# Patient Record
Sex: FEMALE | Race: BLACK OR AFRICAN AMERICAN | Employment: UNEMPLOYED | URBAN - METROPOLITAN AREA
[De-identification: names, ages, dates, MRNs, and addresses within clinical notes are randomized per-mention and may not be internally consistent; named-entity substitution may affect disease eponyms.]

---

## 2018-09-27 ENCOUNTER — ANESTHESIA (OUTPATIENT)
Dept: NON INVASIVE DIAGNOSTICS | Age: 53
End: 2018-09-27
Payer: COMMERCIAL

## 2018-09-27 ENCOUNTER — ANESTHESIA EVENT (OUTPATIENT)
Dept: NON INVASIVE DIAGNOSTICS | Age: 53
End: 2018-09-27
Payer: COMMERCIAL

## 2018-09-27 ENCOUNTER — HOSPITAL ENCOUNTER (OUTPATIENT)
Dept: NON INVASIVE DIAGNOSTICS | Age: 53
Discharge: HOME | End: 2018-09-28
Attending: INTERNAL MEDICINE | Admitting: INTERNAL MEDICINE
Payer: COMMERCIAL

## 2018-09-27 PROBLEM — Z86.79 S/P ABLATION OF VENTRICULAR ARRHYTHMIA: Status: ACTIVE | Noted: 2018-09-27

## 2018-09-27 PROBLEM — Z98.890 S/P ABLATION OF VENTRICULAR ARRHYTHMIA: Status: ACTIVE | Noted: 2018-09-27

## 2018-09-27 LAB
ACT BLD: 109 SECS (ref 79–138)
ACT BLD: 296 SECS (ref 79–138)
ACT BLD: 334 SECS (ref 79–138)

## 2018-09-27 PROCEDURE — C1730 CATH, EP, 19 OR FEW ELECT: HCPCS

## 2018-09-27 PROCEDURE — 93623 PRGRMD STIMJ&PACG IV RX NFS: CPT

## 2018-09-27 PROCEDURE — 77030018729 HC ELECTRD DEFIB PAD CARD -B

## 2018-09-27 PROCEDURE — C1733 CATH, EP, OTHR THAN COOL-TIP: HCPCS

## 2018-09-27 PROCEDURE — 74011250636 HC RX REV CODE- 250/636

## 2018-09-27 PROCEDURE — 74011250636 HC RX REV CODE- 250/636: Performed by: INTERNAL MEDICINE

## 2018-09-27 PROCEDURE — 76060000037 HC ANESTHESIA 3 TO 3.5 HR

## 2018-09-27 PROCEDURE — 77030016894 HC CBL EP DX CATH3 STJU -B

## 2018-09-27 PROCEDURE — 77030010880 HC CBL EP SUPRME STJU -C

## 2018-09-27 PROCEDURE — 77030030806 HC PTCH ENSIT NAVX STJU -G

## 2018-09-27 PROCEDURE — 77030029065 HC DRSG HEMO QCLOT ZMED -B

## 2018-09-27 PROCEDURE — C1893 INTRO/SHEATH, FIXED,NON-PEEL: HCPCS

## 2018-09-27 PROCEDURE — C1894 INTRO/SHEATH, NON-LASER: HCPCS

## 2018-09-27 PROCEDURE — 85347 COAGULATION TIME ACTIVATED: CPT

## 2018-09-27 RX ORDER — LORAZEPAM 1 MG/1
1 TABLET ORAL
Status: DISCONTINUED | OUTPATIENT
Start: 2018-09-27 | End: 2018-09-28 | Stop reason: HOSPADM

## 2018-09-27 RX ORDER — ACETAMINOPHEN 325 MG/1
650 TABLET ORAL
Status: DISCONTINUED | OUTPATIENT
Start: 2018-09-27 | End: 2018-09-28 | Stop reason: HOSPADM

## 2018-09-27 RX ORDER — CETIRIZINE HCL 10 MG
10 TABLET ORAL DAILY
Status: DISCONTINUED | OUTPATIENT
Start: 2018-09-28 | End: 2018-09-28 | Stop reason: HOSPADM

## 2018-09-27 RX ORDER — SODIUM CHLORIDE 9 MG/ML
INJECTION, SOLUTION INTRAVENOUS
Status: DISCONTINUED | OUTPATIENT
Start: 2018-09-27 | End: 2018-09-27 | Stop reason: HOSPADM

## 2018-09-27 RX ORDER — PANTOPRAZOLE SODIUM 40 MG/1
40 TABLET, DELAYED RELEASE ORAL DAILY
Status: DISCONTINUED | OUTPATIENT
Start: 2018-09-28 | End: 2018-09-28 | Stop reason: HOSPADM

## 2018-09-27 RX ORDER — HEPARIN SODIUM 200 [USP'U]/100ML
INJECTION, SOLUTION INTRAVENOUS
Status: COMPLETED
Start: 2018-09-27 | End: 2018-09-27

## 2018-09-27 RX ORDER — FENTANYL CITRATE 50 UG/ML
INJECTION, SOLUTION INTRAMUSCULAR; INTRAVENOUS AS NEEDED
Status: DISCONTINUED | OUTPATIENT
Start: 2018-09-27 | End: 2018-09-27 | Stop reason: HOSPADM

## 2018-09-27 RX ORDER — FENTANYL CITRATE 50 UG/ML
INJECTION, SOLUTION INTRAMUSCULAR; INTRAVENOUS
Status: COMPLETED
Start: 2018-09-27 | End: 2018-09-27

## 2018-09-27 RX ORDER — DOBUTAMINE HYDROCHLORIDE 200 MG/100ML
INJECTION INTRAVENOUS
Status: DISCONTINUED | OUTPATIENT
Start: 2018-09-27 | End: 2018-09-27 | Stop reason: HOSPADM

## 2018-09-27 RX ORDER — DOBUTAMINE HYDROCHLORIDE 200 MG/100ML
INJECTION INTRAVENOUS
Status: DISCONTINUED
Start: 2018-09-27 | End: 2018-09-27

## 2018-09-27 RX ORDER — PROPOFOL 10 MG/ML
INJECTION, EMULSION INTRAVENOUS AS NEEDED
Status: DISCONTINUED | OUTPATIENT
Start: 2018-09-27 | End: 2018-09-27 | Stop reason: HOSPADM

## 2018-09-27 RX ORDER — PROTAMINE SULFATE 10 MG/ML
INJECTION, SOLUTION INTRAVENOUS
Status: COMPLETED
Start: 2018-09-27 | End: 2018-09-27

## 2018-09-27 RX ORDER — HEPARIN SODIUM 1000 [USP'U]/ML
INJECTION, SOLUTION INTRAVENOUS; SUBCUTANEOUS
Status: COMPLETED
Start: 2018-09-27 | End: 2018-09-27

## 2018-09-27 RX ORDER — AMLODIPINE BESYLATE 5 MG/1
5 TABLET ORAL DAILY
Status: DISCONTINUED | OUTPATIENT
Start: 2018-09-28 | End: 2018-09-28 | Stop reason: HOSPADM

## 2018-09-27 RX ORDER — SODIUM CHLORIDE 0.9 % (FLUSH) 0.9 %
5-10 SYRINGE (ML) INJECTION AS NEEDED
Status: DISCONTINUED | OUTPATIENT
Start: 2018-09-27 | End: 2018-09-28 | Stop reason: HOSPADM

## 2018-09-27 RX ORDER — SODIUM CHLORIDE 0.9 % (FLUSH) 0.9 %
5-10 SYRINGE (ML) INJECTION EVERY 8 HOURS
Status: DISCONTINUED | OUTPATIENT
Start: 2018-09-27 | End: 2018-09-28 | Stop reason: HOSPADM

## 2018-09-27 RX ORDER — LIDOCAINE HCL/PF 100 MG/5ML
SYRINGE (ML) INTRAVENOUS
Status: DISCONTINUED
Start: 2018-09-27 | End: 2018-09-27

## 2018-09-27 RX ORDER — PROPOFOL 10 MG/ML
INJECTION, EMULSION INTRAVENOUS
Status: DISCONTINUED | OUTPATIENT
Start: 2018-09-27 | End: 2018-09-27 | Stop reason: HOSPADM

## 2018-09-27 RX ORDER — MIDAZOLAM HYDROCHLORIDE 1 MG/ML
INJECTION, SOLUTION INTRAMUSCULAR; INTRAVENOUS
Status: COMPLETED
Start: 2018-09-27 | End: 2018-09-27

## 2018-09-27 RX ORDER — LIDOCAINE HYDROCHLORIDE 20 MG/ML
INJECTION, SOLUTION EPIDURAL; INFILTRATION; INTRACAUDAL; PERINEURAL AS NEEDED
Status: DISCONTINUED | OUTPATIENT
Start: 2018-09-27 | End: 2018-09-27 | Stop reason: HOSPADM

## 2018-09-27 RX ORDER — HEPARIN SODIUM 200 [USP'U]/100ML
1000 INJECTION, SOLUTION INTRAVENOUS ONCE
Status: COMPLETED | OUTPATIENT
Start: 2018-09-27 | End: 2018-09-27

## 2018-09-27 RX ORDER — ONDANSETRON 2 MG/ML
INJECTION INTRAMUSCULAR; INTRAVENOUS AS NEEDED
Status: DISCONTINUED | OUTPATIENT
Start: 2018-09-27 | End: 2018-09-27 | Stop reason: HOSPADM

## 2018-09-27 RX ORDER — FENTANYL CITRATE 50 UG/ML
12.5-5 INJECTION, SOLUTION INTRAMUSCULAR; INTRAVENOUS
Status: DISCONTINUED | OUTPATIENT
Start: 2018-09-27 | End: 2018-09-27

## 2018-09-27 RX ORDER — AMLODIPINE BESYLATE 5 MG/1
5 TABLET ORAL DAILY
COMMUNITY

## 2018-09-27 RX ORDER — DOBUTAMINE HYDROCHLORIDE 200 MG/100ML
0-10 INJECTION INTRAVENOUS
Status: DISCONTINUED | OUTPATIENT
Start: 2018-09-27 | End: 2018-09-27

## 2018-09-27 RX ORDER — ENOXAPARIN SODIUM 100 MG/ML
40 INJECTION SUBCUTANEOUS EVERY 24 HOURS
Status: DISCONTINUED | OUTPATIENT
Start: 2018-09-28 | End: 2018-09-28 | Stop reason: HOSPADM

## 2018-09-27 RX ORDER — PROTAMINE SULFATE 10 MG/ML
INJECTION, SOLUTION INTRAVENOUS AS NEEDED
Status: DISCONTINUED | OUTPATIENT
Start: 2018-09-27 | End: 2018-09-27 | Stop reason: HOSPADM

## 2018-09-27 RX ORDER — ONDANSETRON 2 MG/ML
4 INJECTION INTRAMUSCULAR; INTRAVENOUS
Status: DISCONTINUED | OUTPATIENT
Start: 2018-09-27 | End: 2018-09-28 | Stop reason: HOSPADM

## 2018-09-27 RX ORDER — MIDAZOLAM HYDROCHLORIDE 1 MG/ML
INJECTION, SOLUTION INTRAMUSCULAR; INTRAVENOUS AS NEEDED
Status: DISCONTINUED | OUTPATIENT
Start: 2018-09-27 | End: 2018-09-27 | Stop reason: HOSPADM

## 2018-09-27 RX ORDER — MIDAZOLAM HYDROCHLORIDE 1 MG/ML
1-5 INJECTION, SOLUTION INTRAMUSCULAR; INTRAVENOUS
Status: DISCONTINUED | OUTPATIENT
Start: 2018-09-27 | End: 2018-09-27

## 2018-09-27 RX ORDER — LIDOCAINE HYDROCHLORIDE 10 MG/ML
INJECTION, SOLUTION EPIDURAL; INFILTRATION; INTRACAUDAL; PERINEURAL
Status: COMPLETED
Start: 2018-09-27 | End: 2018-09-27

## 2018-09-27 RX ORDER — LIDOCAINE HYDROCHLORIDE 10 MG/ML
1-40 INJECTION INFILTRATION; PERINEURAL
Status: DISCONTINUED | OUTPATIENT
Start: 2018-09-27 | End: 2018-09-27

## 2018-09-27 RX ORDER — HEPARIN SODIUM 1000 [USP'U]/ML
INJECTION, SOLUTION INTRAVENOUS; SUBCUTANEOUS AS NEEDED
Status: DISCONTINUED | OUTPATIENT
Start: 2018-09-27 | End: 2018-09-27 | Stop reason: HOSPADM

## 2018-09-27 RX ADMIN — FENTANYL CITRATE 50 MCG: 50 INJECTION, SOLUTION INTRAMUSCULAR; INTRAVENOUS at 15:14

## 2018-09-27 RX ADMIN — PROPOFOL 40 MG: 10 INJECTION, EMULSION INTRAVENOUS at 12:56

## 2018-09-27 RX ADMIN — HEPARIN SODIUM 3000 UNITS: 1000 INJECTION, SOLUTION INTRAVENOUS; SUBCUTANEOUS at 13:31

## 2018-09-27 RX ADMIN — HEPARIN SODIUM 12000 UNITS: 1000 INJECTION, SOLUTION INTRAVENOUS; SUBCUTANEOUS at 13:10

## 2018-09-27 RX ADMIN — PROPOFOL 50 MG: 10 INJECTION, EMULSION INTRAVENOUS at 12:18

## 2018-09-27 RX ADMIN — MIDAZOLAM HYDROCHLORIDE 2 MG: 1 INJECTION, SOLUTION INTRAMUSCULAR; INTRAVENOUS at 12:12

## 2018-09-27 RX ADMIN — FENTANYL CITRATE 50 MCG: 50 INJECTION, SOLUTION INTRAMUSCULAR; INTRAVENOUS at 12:12

## 2018-09-27 RX ADMIN — FENTANYL CITRATE 50 MCG: 50 INJECTION, SOLUTION INTRAMUSCULAR; INTRAVENOUS at 12:27

## 2018-09-27 RX ADMIN — FENTANYL CITRATE 50 MCG: 50 INJECTION, SOLUTION INTRAMUSCULAR; INTRAVENOUS at 14:16

## 2018-09-27 RX ADMIN — DOBUTAMINE HYDROCHLORIDE 5 MCG/KG/MIN: 200 INJECTION INTRAVENOUS at 13:19

## 2018-09-27 RX ADMIN — PROTAMINE SULFATE 20 MG: 10 INJECTION, SOLUTION INTRAVENOUS at 14:35

## 2018-09-27 RX ADMIN — PROPOFOL 50 MCG/KG/MIN: 10 INJECTION, EMULSION INTRAVENOUS at 12:18

## 2018-09-27 RX ADMIN — ONDANSETRON 4 MG: 2 INJECTION INTRAMUSCULAR; INTRAVENOUS at 14:58

## 2018-09-27 RX ADMIN — PROTAMINE SULFATE 10 MG: 10 INJECTION, SOLUTION INTRAVENOUS at 14:32

## 2018-09-27 RX ADMIN — HEPARIN SODIUM 1000 UNITS: 200 INJECTION, SOLUTION INTRAVENOUS at 12:32

## 2018-09-27 RX ADMIN — SODIUM CHLORIDE: 9 INJECTION, SOLUTION INTRAVENOUS at 12:08

## 2018-09-27 RX ADMIN — FENTANYL CITRATE 50 MCG: 50 INJECTION, SOLUTION INTRAMUSCULAR; INTRAVENOUS at 14:59

## 2018-09-27 RX ADMIN — LIDOCAINE HYDROCHLORIDE 60 MG: 20 INJECTION, SOLUTION EPIDURAL; INFILTRATION; INTRACAUDAL; PERINEURAL at 12:18

## 2018-09-27 RX ADMIN — HEPARIN SODIUM IN SODIUM CHLORIDE 1000 UNITS: 200 INJECTION INTRAVENOUS at 12:32

## 2018-09-27 RX ADMIN — FENTANYL CITRATE 50 MCG: 50 INJECTION, SOLUTION INTRAMUSCULAR; INTRAVENOUS at 15:11

## 2018-09-27 RX ADMIN — PROPOFOL 20 MG: 10 INJECTION, EMULSION INTRAVENOUS at 12:42

## 2018-09-27 RX ADMIN — PROTAMINE SULFATE 10 MG: 10 INJECTION, SOLUTION INTRAVENOUS at 14:33

## 2018-09-27 RX ADMIN — PROPOFOL 40 MG: 10 INJECTION, EMULSION INTRAVENOUS at 13:50

## 2018-09-27 RX ADMIN — PROPOFOL 40 MG: 10 INJECTION, EMULSION INTRAVENOUS at 14:12

## 2018-09-27 RX ADMIN — PROTAMINE SULFATE 20 MG: 10 INJECTION, SOLUTION INTRAVENOUS at 14:36

## 2018-09-27 RX ADMIN — ONDANSETRON 4 MG: 2 INJECTION, SOLUTION INTRAMUSCULAR; INTRAVENOUS at 19:34

## 2018-09-27 RX ADMIN — PROPOFOL 40 MG: 10 INJECTION, EMULSION INTRAVENOUS at 13:24

## 2018-09-27 RX ADMIN — LIDOCAINE HYDROCHLORIDE 8 ML: 10 INJECTION, SOLUTION EPIDURAL; INFILTRATION; INTRACAUDAL; PERINEURAL at 12:32

## 2018-09-27 RX ADMIN — Medication 10 ML: at 21:23

## 2018-09-27 NOTE — ANESTHESIA POSTPROCEDURE EVALUATION
Post-Anesthesia Evaluation and Assessment Patient: Cate Mims MRN: [id number]  SSN: [id number] YOB: 1965  Age: 48 y.o. Sex: female Cardiovascular Function/Vital Signs Visit Vitals  /79  Pulse 79  Temp 36.6 °C (97.8 °F)  Resp 18  Ht 5' 4\" (1.626 m)  Wt 78 kg (172 lb)  SpO2 96%  BMI 29.52 kg/m2 Patient is status post MAC, total IV anesthesia anesthesia for * No procedures listed *. Nausea/Vomiting: None Postoperative hydration reviewed and adequate. Pain: 
Pain Scale 1: Numeric (0 - 10) (09/27/18 1047) Pain Intensity 1: 0 (09/27/18 1047) Managed Neurological Status: At baseline Mental Status and Level of Consciousness: Arousable Pulmonary Status:  
O2 Device: Room air (09/27/18 1518) Adequate oxygenation and airway patent Complications related to anesthesia: None Post-anesthesia assessment completed. No concerns Signed By: Haresh Rae MD   
 September 27, 2018

## 2018-09-27 NOTE — PROGRESS NOTES
TRANSFER - IN REPORT: 
 
Verbal report received from Miladis (tonia) on Nany Evans  being received from EP Lab(unit) for ordered procedure Report consisted of patients Situation, Background, Assessment and  
Recommendations(SBAR). Information from the following report(s) SBAR, Kardex, Procedure Summary, Intake/Output, MAR and Recent Results was reviewed with the receiving nurse. Opportunity for questions and clarification was provided. Assessment completed upon patients arrival to unit and care assumed.

## 2018-09-27 NOTE — ANESTHESIA PREPROCEDURE EVALUATION
Anesthetic History No history of anesthetic complications Review of Systems / Medical History Patient summary reviewed, nursing notes reviewed and pertinent labs reviewed Pulmonary Within defined limits Neuro/Psych Within defined limits Cardiovascular Hypertension Dysrhythmias : PVC 
 
 
 
  
GI/Hepatic/Renal 
Within defined limits Endo/Other Within defined limits Other Findings Physical Exam 
 
Airway Mallampati: II 
TM Distance: > 6 cm Neck ROM: normal range of motion Mouth opening: Normal 
 
 Cardiovascular Regular rate and rhythm,  S1 and S2 normal,  no murmur, click, rub, or gallop Dental 
No notable dental hx Pulmonary Breath sounds clear to auscultation Abdominal 
GI exam deferred Other Findings Anesthetic Plan ASA: 3 Anesthesia type: MAC and total IV anesthesia Induction: Intravenous Anesthetic plan and risks discussed with: Patient

## 2018-09-27 NOTE — PROCEDURES
[address]  [phone]    Patient ID:  Patient: Joseph Rehman  MRN: [id number]  Age: 48 y.o.  : 1965  Gender: female  Study Date: 2018    History:  This is a female with symptomatic premature ventricular contractions, here for invasive EP study and ablation.      Procedures Performed:   1. Comprehensive EP study with ablation of ventricular arrhythmia mechanism (40512)   2. EP study with left atrial pacing and recording (46281)   3. Pacing and stimulation during IV drug infusion for arrhythmia induction (06608)      Summary:   1. Radiofrequency ablation of an epicardial right ventricular outflow tract PVC focus from an endocardial approach. There was at best a partial effect. 2. Normal antegrade function. 3. Normal retrograde function.      Plan:   1. Consider repeat EP study and ablation attempt with higher resolution mapping and irrigated catheter. An epicardial ablation may be needed. 2. Consider antiarrhythmic therapy with flecainide.      Follow up Plan:   1. F/U in office in 6 weeks. Will set up a 48 hour Holter in the future.      Procedure description:   After consent was obtained, the patient was brought to the EP lab and sedated [facility] by the anesthesiologist who remained in attendance throughout the case. Three sheaths were inserted into the right femoral vein and ultimately one sheath into the right femoral artery in the usual fashion without issue.  A deflectable duodecapolar coronary sinus catheter was used for pacing and recording from the left atrium. Initial catheters advanced into the heart under fluoroscopic guidance.  A roving catheter was used to pace and record from the right and left heart as described below.      The baseline rhythm was sinus frequent PVC's including a bigeminy pattern (, , ,  msec) was noted.  Antegrade conduction was midline and decremental without preexcitation.  The AH and HV interval were 78 and 54 msec, respectively.   The WBCL was 300 msec, AVNERP 600/<=180 and AERP 600/180 msec.  Retrograde conduction was midline and decremental.  The retrograde WBCL was 310 msec.      A deflectable, non-irrigated ablation catheter (8FR 5 mm tip) was used. Using the [facility] ([employer]) three dimensional electroanatomic mapping system, a 3D voltage map was created of the relevant right ventricle, and later, the left ventricle.     After getting right femoral arterial access, heparin was given and boluses were given with infusion with a goal ACT of >300 sec. Prior to ablation, dobutamine infusion was used to facilitate arrhythmia induction including during pacing and maximize PVC spontaneity.     Ablation for PVC's:  Spontaneous uniform PVC's with early precordial transition and positive axis in the inferior leads were noted. There was no notching and peak-maria dolores was >50% of the QRS duration. Dobutamine infusion was used to increase the frequency of PVC's, when PVC's were uncommon. The 8Fr 5 mm tip mapping and ablation catheter inside a SL-1 long support sheath was positioned in the right ventricular outflow tract and activation and pace-mapping revealed a site that was on time with the QRS but good pace-map. There were sites with QS unipolar morphology at the distal ablation pole, but due to absence of very early EGM's at the ablation catheter relative to QRS onset, I decided to try mapping in the left ventricle. A retrograde transaortic access into the left ventricle was performed without issue. Mapping in the left ventricle including the LVOT did not reveal an early activation site. There were low amplitude and fractionated EGM's in the supravalvular portion of the aorta, but this was NOT in the coronary cusps. After mapping further, I decided to go back to mapping in the RVOT.   Using the 8Fr 5 mm tip mapping and ablation catheter inside a SL-0 long support sheath, a series of ablation lesions were given in the RVOT at the earliest activation site which was -7 msec the earliest and this also had the best pace map. QS complexes were noted during spontaneous PVC's. This ablation did not appear to suppress the PVC's. Because of the relatively large target area, I did not choose to use an irrigated catheter and deliver further ablation to try for further treatment effect since it was unclear that there was a distinct area to deliver therapy. I suspected this focus was epicardial.    I tried to position the ablation catheter in the coronary sinus and advance it to beyond the main body to an anterior branch but it would not advance beyond the proximal CS. I advanced the coronary sinus catheter to the bifurcation of the anterior branch to see if this would yield an earlier ventricular EGM but this would not advance. This was done to see if a coronary cusp PVC was the culprit. After this, I decided to stop any further ablation efforts due to lack of a distinct target area.     After all therapy was complete, the catheters were withdrawn into the inferior vena cava and heparin infusion was discontinued.  The heparin was reversed with a test dose of protamine followed by the full dose to a total of 60 mg.      The catheters and sheaths were removed, hemostasis obtained with manual pressure after ACT check, and she was sent to the recovery area.          Preoperative Diagnosis: As above. Postoperative Diagnosis: As above. Procedure: As above. Surgeon(s) and Role: Dominique Laboy MD - Primary   Anesthesia: [facility]. Estimated Blood Loss: 20 cc. Specimens: * No specimens in log *   Findings: As above.   Complications: None.      Ablation therapy:  6 treatments totaling 3.2 minutes of RFA  X-ray:  19.2 minutes      Signed:  Doris Riojas

## 2018-09-27 NOTE — PROGRESS NOTES
S/p EP study with apparent outflow tract PVC's by surface ECG. Mapping in the RVOT, LVOT, aortic root, etc, did not reveal a site of early activation. There was low amplitude, fractionated EGM in the aorta but not in the region of the cusps. Ablation was given in the site of earliest activation (up to -7 msec from QRS onset) and QS unipolar electrogram in the RVOT but PVC's did not extinguish. I decided to not move to an irrigated catheter since the target area was not well-defined. No complications, full note to follow. Signed By: Aleksey Ott MD   
 September 27, 2018

## 2018-09-27 NOTE — PROGRESS NOTES
TRANSFER - IN REPORT: 
 
Verbal report received from Tani Jacobo CRNA on Cate Box  being received from EP for routine progression of care. Report consisted of patients Situation, Background, Assessment and Recommendations(SBAR). Information from the following report(s) Procedure Summary and MAR was reviewed with the receiving clinician. Opportunity for questions and clarification was provided. Assessment completed upon patients arrival to [address] [place] [address] and care assumed. Cardiac Cath Lab Recovery Arrival Note: 
 
Cate Mims arrived to [facility] recovery area. Patient procedure= PVC hunt. Patient on cardiac monitor, non-invasive blood pressure, SPO2 monitor. On room air. Patient status doing well without problems. Patient is A&Ox 3. Patient reports no c/o. PROCEDURE SITE CHECK: 
 
Procedure site:without any bleeding and no hematoma, no pain/discomfort reported at procedure site. No change in patient status. Continue to monitor patient and status.

## 2018-09-27 NOTE — PROGRESS NOTES
1934 - Pt. Reported feeling nausea. Received order for IV Zofran 4 mg from MD. RN administered Zofran.

## 2018-09-27 NOTE — IP AVS SNAPSHOT
[address] [facility] 
[phone] Patient: Wesly Resendiz MRN: [id number] Wilber Specking About your hospitalization You were admitted on:  September 27, 2018 You last received care in the:  [facility] 2 INTRVNTNL CARDIO You were discharged on:  September 28, 2018 Why you were hospitalized Your primary diagnosis was:  Not on File Your diagnoses also included:  S/P Ablation Of Ventricular Arrhythmia Follow-up Information Follow up With Details Comments Contact Info Kaylin Monge, [address] [facility] 
[phone] Axel Romero MD Schedule an appointment as soon as possible for a visit to follow-up the PVC ablation. Will need to get a 24 hour Holter monitor at that time. [address] [facility] 
[phone] Discharge Orders None A check keke indicates which time of day the medication should be taken. My Medications CONTINUE taking these medications Instructions Each Dose to Equal  
 Morning Noon Evening Bedtime  
 amLODIPine 5 mg tablet Commonly known as:  Michele Bloom Your last dose was: Your next dose is: Take 5 mg by mouth daily. 5 mg NexIUM 40 mg capsule Generic drug:  esomeprazole Your last dose was: Your next dose is: Take 40 mg by mouth daily. 40 mg PROBIOTIC 4X 10-15 mg Tbec Generic drug:  B.infantis-B.ani-B.long-B.bifi Your last dose was: Your next dose is: Take 1 Tab by mouth daily. 1 Tab ZyrTEC 10 mg Cap Generic drug:  Cetirizine Your last dose was: Your next dose is: Take 10 mg by mouth. 10 mg Discharge Instructions POST-EP STUDY AND ABLATION DISCHARGE INSTRUCTIONS: 
 
 You had a PVC ablation by Dr. Sam Gonzáles on 9/27/2018. This appears to be an epicardial focus meaning the the patch of muscle fibers in the heart that are firing off the signals (PVC's) are closer to the outside of the ventricle. Therapy was delivered with the hope of heating through the heart from the inside to affect this area near the outside. There appears to be a partial result so far, will see if this matures further over time. Follow-up in 6 weeks with Dr. Sam Gonzáles (or the nurse practitioner if available) and after that we'll arrange for a 24 hour Holter monitor to see how many PVC's you have. Do not drive, operate any machinery, or sign any legal documents for 24 hours after your procedure. You must have someone to drive you home. You may take a shower 24 hours after your cardiac procedure. Be sure to get the dressing wet and then remove it; gently wash the area with warm soapy water. Pat dry and leave open to air. To help prevent infections, be sure to keep the cath site clean and dry. No lotions, creams, powders, ointments, etc. in the cath site for approximately 1 week. ? Do not take a tub bath, get in a hot tub or swimming pool for approximately 5 days or until the cath site is completely healed. ? No strenuous activity or heavy lifting over 20 lbs. for 7 days. ? After your procedure, some bruising or discomfort is common during the healing process. Tylenol, 1-2 tablets every 6 hours as needed, is recommended if you experience any discomfort. If you experience any signs or symptoms of infection such as fever, chills, or poorly healing incision, persistent tenderness or swelling in the groin, redness and/or warmth to the touch, numbness, significant tingling or pain at the groin site or affected extremity, rash, drainage from the site, or if the leg feels tight or swollen, call your physician right away. ? If bleeding at the site occurs, take a clean gauze pad and apply direct pressure to the groin just above the puncture site, and call your physician right away. ? If your procedure involved ablation therapy, you may feel some mild or vague chest discomfort due to delivery of heat therapy to the heart muscle. This should resolve in 1-2 days. If it gets worse or is associated with shortness of breath, dizziness, loss of consciousness, call your physician right away or call 911 if emergency medical care is needed. Signed By: Winston Flores MD   
 September 28, 2018 Introducing [address]! [facility] introduces Praccelt patient portal. Now you can access parts of your medical record, email your doctor's office, and request medication refills online. 1. In your internet browser, go to [email or web address] 2. Click on the First Time User? Click Here link in the Sign In box. You will see the New Member Sign Up page. 3. Enter your [employer] Access Code exactly as it appears below. You will not need to use this code after youve completed the sign-up process. If you do not sign up before the expiration date, you must request a new code. · [employer] Access Code: [email or web address] Expires: 11/25/2018  3:55 PM 
 
4. Enter the last four digits of your Social Security Number (xxxx) and Date of Birth (mm/dd/yyyy) as indicated and click Submit. You will be taken to the next sign-up page. 5. Create a Praccelt ID. This will be your [employer] login ID and cannot be changed, so think of one that is secure and easy to remember. 6. Create a Praccelt password. You can change your password at any time. 7. Enter your Password Reset Question and Answer. This can be used at a later time if you forget your password. 8. Enter your e-mail address. You will receive e-mail notification when new information is available in [address] 9. Click Sign Up. You can now view and download portions of your medical record. 10. Click the Download Summary menu link to download a portable copy of your medical information. If you have questions, please visit the Frequently Asked Questions section of the [employer] website. Remember, Optrace is NOT to be used for urgent needs. For medical emergencies, dial 911. Now available from your iPhone and Android! Introducing Mio Alex As a [employer] patient, I wanted to make you aware of our electronic visit tool called Mio Alex. [employer] 24/7 allows you to connect within minutes with a medical provider 24 hours a day, seven days a week via a mobile device or tablet or logging into a secure website from your computer. You can access Mio Alex from anywhere in the [place]. A virtual visit might be right for you when you have a simple condition and feel like you just dont want to get out of bed, or cant get away from work for an appointment, when your regular [employer] provider is not available (evenings, weekends or holidays), or when youre out of town and need minor care. Electronic visits cost only $49 and if the [employer] 24/7 provider determines a prescription is needed to treat your condition, one can be electronically transmitted to a nearby pharmacy*. Please take a moment to enroll today if you have not already done so. The enrollment process is free and takes just a few minutes. To enroll, please download the [employer] 24/7 raven to your tablet or phone, or visit [email or web address] to enroll on your computer. And, as an [address] [place] [address] patient with a [employer] account, the results of your visits will be scanned into your electronic medical record and your primary care provider will be able to view the scanned results.    
We urge you to continue to see your regular [employer] provider for your ongoing medical care. And while your primary care provider may not be the one available when you seek a Mio Arguellosangfin virtual visit, the peace of mind you get from getting a real diagnosis real time can be priceless. For more information on Mio Arguellosangfin, view our Frequently Asked Questions (FAQs) at [email or web address]. Sincerely, 
 
Erin Sanchez MD 
Chief Medical Officer Sanjeev Blancas *:  certain medications cannot be prescribed via Mio Josshefali Providers Seen During Your Hospitalization Provider Specialty Primary office phone Axel Romero MD Cardiology [phone] Immunizations Administered for This Admission Name Date Influenza Vaccine (Quad) PF 9/28/2018 Your Primary Care Physician (PCP) Primary Care Physician Office Phone Office Fax Leela Carrington [phone] You are allergic to the following Allergen Reactions Ace Inhibitors Cough Codeine Nausea and Vomiting Tuberculin Ppd Hives Lactose Diarrhea Recent Documentation Height Weight BMI Smoking Status 1.626 m 78 kg 29.52 kg/m2 Never Smoker Emergency Contacts Name Discharge Info Relation Home Work Mobile Sai George DISCHARGE CAREGIVER [3] Spouse [3] [phone] Patient Belongings The following personal items are in your possession at time of discharge: 
  Dental Appliances: None         Home Medications: None   Jewelry: None  Clothing: At bedside    Other Valuables: None Please provide this summary of care documentation to your next provider. Signatures-by signing, you are acknowledging that this After Visit Summary has been reviewed with you and you have received a copy. Patient Signature:  ____________________________________________________________ Date:  ____________________________________________________________ `    
    
 Provider Signature:  ____________________________________________________________ Date:  ____________________________________________________________

## 2018-09-28 VITALS
DIASTOLIC BLOOD PRESSURE: 78 MMHG | BODY MASS INDEX: 29.37 KG/M2 | HEART RATE: 75 BPM | OXYGEN SATURATION: 98 % | RESPIRATION RATE: 16 BRPM | SYSTOLIC BLOOD PRESSURE: 144 MMHG | WEIGHT: 172 LBS | TEMPERATURE: 98.3 F | HEIGHT: 64 IN

## 2018-09-28 PROCEDURE — 74011250637 HC RX REV CODE- 250/637: Performed by: INTERNAL MEDICINE

## 2018-09-28 PROCEDURE — 90471 IMMUNIZATION ADMIN: CPT

## 2018-09-28 PROCEDURE — 74011250636 HC RX REV CODE- 250/636: Performed by: INTERNAL MEDICINE

## 2018-09-28 PROCEDURE — 90686 IIV4 VACC NO PRSV 0.5 ML IM: CPT | Performed by: INTERNAL MEDICINE

## 2018-09-28 RX ADMIN — CETIRIZINE HYDROCHLORIDE 10 MG: 10 TABLET, FILM COATED ORAL at 08:54

## 2018-09-28 RX ADMIN — Medication 1 CAPSULE: at 08:53

## 2018-09-28 RX ADMIN — AMLODIPINE BESYLATE 5 MG: 5 TABLET ORAL at 08:54

## 2018-09-28 RX ADMIN — PANTOPRAZOLE SODIUM 40 MG: 40 TABLET, DELAYED RELEASE ORAL at 08:54

## 2018-09-28 RX ADMIN — Medication 10 ML: at 06:37

## 2018-09-28 RX ADMIN — INFLUENZA VIRUS VACCINE 0.5 ML: 15; 15; 15; 15 SUSPENSION INTRAMUSCULAR at 10:14

## 2018-09-28 NOTE — PROGRESS NOTES
Bedside report received from [address] Assessment, Background, Procedure summary, Intake/Output, MAR, and recent results discussed. Care assumed. Discharge instructions reviewed with patient and family. Allowed adequate time to ask questions, all questions answered. Printed copy of AVS given to patient. All belongings gathered, IV and tele discontinued. Transported via wheelchair by volunteer to main entrance and into care of family.

## 2018-09-28 NOTE — PROGRESS NOTES
Pt. Ambulated to the bathroom and in the ruffin without difficulty. Pt. Reported no SOB, chest pain, and dizziness. Pt. reported soreness at the right groin cath site with leg movement.

## 2018-09-28 NOTE — PROGRESS NOTES
Problem: Falls - Risk of 
Goal: *Absence of Falls Document Jefferson Cohen Fall Risk and appropriate interventions in the flowsheet. Fall Risk Interventions: 
  
 
  
 
Medication Interventions: Assess postural VS orthostatic hypotension, Evaluate medications/consider consulting pharmacy, Patient to call before getting OOB, Teach patient to arise slowly Problem: Pressure Injury - Risk of 
Goal: *Prevention of pressure injury Document Roland Scale and appropriate interventions in the flowsheet. Pressure Injury Interventions:

## 2018-09-28 NOTE — PROGRESS NOTES
Doing well post-PVC ablation. She had some PVC's very early in the AM, but during waking hours, rare PVC's, no bigeminy. Ambulatory and taking oral. 
 
 
Visit Vitals  /78 (BP 1 Location: Left arm, BP Patient Position: At rest)  Pulse 75  Temp 98.3 °F (36.8 °C)  Resp 16  
 Ht 5' 4\" (1.626 m)  Wt 78 kg (172 lb)  SpO2 98%  BMI 29.52 kg/m2 ND, NAD. 
RRR, no rub. Lungs CTAB anteriorly. Groin site OK. Awake, appropriate, neuro grossly nonfocal. 
 
Tele:  Currently sinus with rare PVC's, had brief episodes of PVC's, including seconds of bigeminy very early in the AM. PLAN:  Discharge to home with F/U in the office in 6 weeks. All questions answered. Patient is aware of signs and sx warranting urgent med F/U or calling 911. Signed By: Axel Romero MD   
 September 28, 2018

## 2018-09-28 NOTE — DISCHARGE INSTRUCTIONS
POST-EP STUDY AND ABLATION DISCHARGE INSTRUCTIONS:    You had a PVC ablation by Dr. Leonora Bledsoe on 9/27/2018. This appears to be an epicardial focus meaning the the patch of muscle fibers in the heart that are firing off the signals (PVC's) are closer to the outside of the ventricle. Therapy was delivered with the hope of heating through the heart from the inside to affect this area near the outside. There appears to be a partial result so far, will see if this matures further over time. Follow-up in 6 weeks with Dr. Leonora Bledsoe (or the nurse practitioner if available) and after that we'll arrange for a 24 hour Holter monitor to see how many PVC's you have. Do not drive, operate any machinery, or sign any legal documents for 24 hours after your procedure. You must have someone to drive you home. You may take a shower 24 hours after your cardiac procedure. Be sure to get the dressing wet and then remove it; gently wash the area with warm soapy water. Pat dry and leave open to air. To help prevent infections, be sure to keep the cath site clean and dry. No lotions, creams, powders, ointments, etc. in the cath site for approximately 1 week.  Do not take a tub bath, get in a hot tub or swimming pool for approximately 5 days or until the cath site is completely healed.  No strenuous activity or heavy lifting over 20 lbs. for 7 days.  After your procedure, some bruising or discomfort is common during the healing process. Tylenol, 1-2 tablets every 6 hours as needed, is recommended if you experience any discomfort. If you experience any signs or symptoms of infection such as fever, chills, or poorly healing incision, persistent tenderness or swelling in the groin, redness and/or warmth to the touch, numbness, significant tingling or pain at the groin site or affected extremity, rash, drainage from the site, or if the leg feels tight or swollen, call your physician right away.      If bleeding at the site occurs, take a clean gauze pad and apply direct pressure to the groin just above the puncture site, and call your physician right away.  If your procedure involved ablation therapy, you may feel some mild or vague chest discomfort due to delivery of heat therapy to the heart muscle. This should resolve in 1-2 days. If it gets worse or is associated with shortness of breath, dizziness, loss of consciousness, call your physician right away or call 911 if emergency medical care is needed.       Signed By: Greg Rowell MD     September 28, 2018

## 2018-09-28 NOTE — PROGRESS NOTES
Bedside shift change report given to [place], RN (oncoming nurse) by Parveen Root RN (offgoing nurse). Report included the following information SBAR, Kardex, Procedure Summary, Intake/Output, MAR, Accordion, Recent Results, Med Rec Status, Cardiac Rhythm NSR, PVCs, Alarm Parameters , Pre Procedure Checklist, Procedure Verification and Quality Measures.

## 2018-10-01 NOTE — H&P
[facility] HISTORY AND PHYSICAL Adrian Chino 
MR#: [id number] : 1965 ACCOUNT #: [de-identified] ADMIT DATE: 2018 CHIEF COMPLAINT:  Palpitations. HISTORY OF PRESENT ILLNESS:  This is a 61-year-old female with a history of symptomatic PVCs. She has tried medications and these have not worked. She would rather not try other medications and is here today for invasive evaluation and treatment. ALLERGIES: 
1. ACE INHIBITOR (COUGH). 2.  CODEINE (NAUSEA AND VOMITING). 3.  TUBERCULIN PACK (HIVES). 4.  LACTOSE (DIARRHEA). MEDICATIONS:  Please see the list. 
 
MEDICATIONS:  Please refer to the last office note. FAMILY HISTORY:  Noncontributory. SOCIAL HISTORY:  Nonsmoker. REVIEW OF SYSTEMS:  Noncontributory except as noted above. PHYSICAL EXAMINATION: 
VITAL SIGNS:  Afebrile. GENERAL:  Nondiaphoretic, not in acute distress. CARDIOVASCULAR:  Regular rate and rhythm. RESPIRATORY:  Unlabored, clear to auscultation bilaterally. NEUROLOGIC:  Awake, appropriate grossly nonfocal. 
 
TELEMETRY:  Sinus rhythm with PVCs including in a bigeminy pattern. ASSESSMENT: 
1. Symptomatic premature ventricular contractions including ventricular bigeminy. 2.  As above. RECOMMENDATIONS:  Given the potential benefits, risks, and alternatives, she agrees to proceed with invasive electrophysiology study with hopeful PVC ablation. MD JAY Snow/ 
D: 10/01/2018 10:03    
T: 10/01/2018 10:24 
JOB #: [id number]